# Patient Record
Sex: MALE | Race: WHITE | Employment: FULL TIME
[De-identification: names, ages, dates, MRNs, and addresses within clinical notes are randomized per-mention and may not be internally consistent; named-entity substitution may affect disease eponyms.]

---

## 2020-08-26 ENCOUNTER — EMPLOYEE WELLNESS (OUTPATIENT)
Dept: OTHER | Facility: CLINIC | Age: 45
End: 2020-08-26

## 2020-08-26 LAB
CHOLEST SERPL-MCNC: 158 MG/DL
GLUCOSE SERPL-MCNC: 92 MG/DL (ref 74–99)
HDLC SERPL-MCNC: 42 MG/DL (ref 40–60)
LDLC SERPL CALC-MCNC: 94.6 MG/DL (ref 0–100)
TRIGL SERPL-MCNC: 107 MG/DL (ref ?–150)

## 2021-07-06 LAB
CHOLEST SERPL-MCNC: 164 MG/DL
GLUCOSE SERPL-MCNC: 86 MG/DL (ref 74–99)
HDLC SERPL-MCNC: 39 MG/DL (ref 40–60)
LDLC SERPL CALC-MCNC: 104.8 MG/DL (ref 0–100)
TRIGL SERPL-MCNC: 101 MG/DL (ref ?–150)

## 2021-09-23 ENCOUNTER — TRANSCRIBE ORDER (OUTPATIENT)
Dept: SCHEDULING | Age: 46
End: 2021-09-23

## 2021-09-24 ENCOUNTER — TRANSCRIBE ORDER (OUTPATIENT)
Dept: SCHEDULING | Age: 46
End: 2021-09-24

## 2021-09-24 DIAGNOSIS — R10.10 UPPER ABDOMINAL PAIN: Primary | ICD-10-CM

## 2021-10-15 ENCOUNTER — HOSPITAL ENCOUNTER (OUTPATIENT)
Dept: CT IMAGING | Age: 46
Discharge: HOME | End: 2021-10-15
Payer: COMMERCIAL

## 2021-10-15 DIAGNOSIS — R10.10 UPPER ABDOMINAL PAIN: ICD-10-CM

## 2021-10-15 LAB — CREAT UR-MCNC: 1 MG/DL (ref 0.6–1.3)

## 2021-10-15 PROCEDURE — 74177 CT ABD & PELVIS W/CONTRAST: CPT

## 2021-10-15 PROCEDURE — 82565 ASSAY OF CREATININE: CPT

## 2021-10-15 PROCEDURE — 74011000636 HC RX REV CODE- 636

## 2021-10-15 RX ADMIN — IOPAMIDOL 100 ML: 612 INJECTION, SOLUTION INTRAVENOUS at 17:10

## 2021-11-05 ENCOUNTER — TRANSCRIBE ORDER (OUTPATIENT)
Dept: SCHEDULING | Age: 46
End: 2021-11-05

## 2021-11-05 DIAGNOSIS — K80.20 GALLSTONE: Primary | ICD-10-CM

## 2021-11-12 ENCOUNTER — HOSPITAL ENCOUNTER (OUTPATIENT)
Dept: NUCLEAR MEDICINE | Age: 46
Discharge: HOME | End: 2021-11-12
Payer: COMMERCIAL

## 2021-11-12 VITALS — WEIGHT: 270 LBS

## 2021-11-12 DIAGNOSIS — K80.20 GALLSTONE: ICD-10-CM

## 2021-11-12 PROCEDURE — A9537 TC99M MEBROFENIN: HCPCS

## 2021-11-12 PROCEDURE — 74011000258 HC RX REV CODE- 258

## 2021-11-12 PROCEDURE — 74011250636 HC RX REV CODE- 250/636

## 2021-11-12 RX ORDER — SODIUM CHLORIDE 9 MG/ML
50 INJECTION, SOLUTION INTRAVENOUS
Status: COMPLETED | OUTPATIENT
Start: 2021-11-12 | End: 2021-11-12

## 2021-11-12 RX ORDER — KIT FOR THE PREPARATION OF TECHNETIUM TC 99M MEBROFENIN 45 MG/10ML
6.1 INJECTION, POWDER, LYOPHILIZED, FOR SOLUTION INTRAVENOUS
Status: COMPLETED | OUTPATIENT
Start: 2021-11-12 | End: 2021-11-12

## 2021-11-12 RX ADMIN — KIT FOR THE PREPARATION OF TECHNETIUM TC 99M MEBROFENIN 6.1 MILLICURIE: 45 INJECTION, POWDER, LYOPHILIZED, FOR SOLUTION INTRAVENOUS at 09:30

## 2021-11-12 RX ADMIN — SINCALIDE 2.45 MCG: 5 INJECTION, POWDER, LYOPHILIZED, FOR SOLUTION INTRAVENOUS at 11:45

## 2021-11-12 RX ADMIN — SODIUM CHLORIDE 50 ML/HR: 900 INJECTION, SOLUTION INTRAVENOUS at 11:45

## 2021-12-17 ENCOUNTER — OFFICE VISIT (OUTPATIENT)
Dept: SURGERY | Age: 46
End: 2021-12-17
Payer: COMMERCIAL

## 2021-12-17 VITALS
BODY MASS INDEX: 34.57 KG/M2 | HEART RATE: 70 BPM | OXYGEN SATURATION: 98 % | HEIGHT: 75 IN | WEIGHT: 278 LBS | DIASTOLIC BLOOD PRESSURE: 88 MMHG | TEMPERATURE: 97.8 F | RESPIRATION RATE: 18 BRPM | SYSTOLIC BLOOD PRESSURE: 134 MMHG

## 2021-12-17 DIAGNOSIS — K80.10 CHRONIC CHOLECYSTITIS WITH CALCULUS: Primary | ICD-10-CM

## 2021-12-17 PROCEDURE — 99204 OFFICE O/P NEW MOD 45 MIN: CPT | Performed by: SURGERY

## 2021-12-17 RX ORDER — LISINOPRIL AND HYDROCHLOROTHIAZIDE 10; 12.5 MG/1; MG/1
TABLET ORAL
COMMUNITY
Start: 2021-12-10

## 2021-12-17 NOTE — PROGRESS NOTES
Consult    Patient: Judith Dominguez MRN: [id number]  SSN: [id number]    YOB: 1975  Age: 48 y.o. Sex: male      Subjective:      Judith Dominguez is a 48 y.o. male white male who presents with approximate 2-year history of intermittent abdominal pain was initially was diffuse in a bandlike distribution of around the upper abdomen but over the last years is gradually developed in the primarily the right flank discomfort which is constantly present however is exacerbated by fatty foods with associated nausea and bloating. Patient underwent a diagnostic evaluation and was found to have cholelithiasis for which he is  referred for surgical opinion regard to management. Patient denies acholic stools bilirubinuria jaundice and history of pancreatitis  Allergies: No known drug allergies  Current medications: See medication list  Past medical history:  1. (Reviewed by [employer] 35 with obesity comorbidities of hypertension and obstructive sleep apnea. Past surgical history:  1. History of rectal carcinoid 2019 status post excision  Social history: Denies utilization with tobacco or alcohol  Family history: Mother 74hypercholesterolemia  Father 75history of colon and prostate carcinoma, hypercholesterolemia, hypertension, clinical severe obesity  Sister 45healthy    No Known Allergies  Current Outpatient Medications   Medication Sig Dispense Refill    lisinopril-hydroCHLOROthiazide (PRINZIDE, ZESTORETIC) 10-12.5 mg per tablet       therapeutic multivitamin-minerals (THERAGRAN-M) tablet Take 1 Tablet by mouth daily.        Past Medical History:   Diagnosis Date    Hypertension     Sleep apnea     cpap     Past Surgical History:   Procedure Laterality Date    HX COLONOSCOPY      HX GI      neuroendocrine tumor from rectal area      Social History     Tobacco Use    Smoking status: Never Smoker    Smokeless tobacco: Never Used   Substance Use Topics    Alcohol use: Not Currently     No family history on file. Review of Systems:    General: Denies fevers, chills, night sweats, fatigue, weight loss, or weight gain. HEENT: Denies changes in auditory or visual acuity, recurrent pharyngitis, epistaxis, chronic rhinorrhea, vertigo    Respiratory: Denies increasing shortness of breath, productive cough, hemoptysis    Cardiac: Denies known history of cardiac disease, heart murmur, palpitations    GI: Denies dysphagia, recurrent emesis, hematemesis, changes in bowel habits, hematochezia, melena    : Denies hematuria frequency urgency dysuria    Musculoskeletal: Denies fractures, dislocations    Neurologic: Denies history of CVA, paralysis paresthesias, recurrent cephalgia, seizures    Endocrine: Denies polyuria, polydipsia, polyphagia, heat and cold intolerance    Lymph/heme: Denies a history of malignancy, anemia, bruising, blood transfusions    Integumentary: Negative for dermatitis     Objective:     Vitals:    12/17/21 1408   BP: 134/88   Pulse: 70   Resp: 18   Temp: 97.8 °F (36.6 °C)   SpO2: 98%   Weight: 126.1 kg (278 lb)   Height: 6' 3\" (1.905 m)        General: no acute distress, nontoxic in appearance. Head: Normocephalic, atraumatic  Mouth: Clear, no overt lesions, oral mucosa is pink and moist.  Neck: Supple, no masses, no adenopathy or carotid bruits, trachea midline  Resp: Clear to auscultation bilaterally, no wheezing, rhonchi, or rales, excursions normal and symmetrical.  Cardio: Regular rate and rhythm, no murmurs, clicks, gallops, or rubs. Abdomen: soft, nontender, nondistended, normoactive bowel sounds, no hernias. Extremities: Warm, well perfused, no tenderness or swelling, normal gait/station, without edema or varicosities  Neuro: Sensation and strength grossly intact and symmetrical.  Psych: Alert and oriented to person, place, and time.   September 17, 2021 CBC, CMP: SGPT 49  September 24, 2021 abdominal CT: Cholelithiasis  November 5, 2021 HIDA scan negative however administration of CCK duplicated symptoms          Assessment:     Symptomatic chronic calculus cholecystitis    Plan:     Discussed the clinical presentation laboratory and radiologic evaluation. Patient after discussing the fact that his symptomatology is somewhat atypical is abdominal discomfort and complaints overnight secondary to biliary disease. After discussing the options he is elected proceed with surgical invention utilizing laparoscopic tensely open cholecystectomy with intraoperative cholangiography.   Procedures patient very [address] time convenient for the patient seen in follow-up after 2 weeks following surgical procedure    Signed By: Lorenzo Gallardo DO     December 17, 2021

## 2021-12-17 NOTE — H&P (VIEW-ONLY)
Consult    Patient: Zoie Castrejon MRN: [id number]  SSN: [id number]    YOB: 1975  Age: 48 y.o. Sex: male      Subjective:      Zoie Castrejon is a 48 y.o. male white male who presents with approximate 2-year history of intermittent abdominal pain was initially was diffuse in a bandlike distribution of around the upper abdomen but over the last years is gradually developed in the primarily the right flank discomfort which is constantly present however is exacerbated by fatty foods with associated nausea and bloating. Patient underwent a diagnostic evaluation and was found to have cholelithiasis for which he is  referred for surgical opinion regard to management. Patient denies acholic stools bilirubinuria jaundice and history of pancreatitis  Allergies: No known drug allergies  Current medications: See medication list  Past medical history:  1. (Reviewed by [employer] [facility] with obesity comorbidities of hypertension and obstructive sleep apnea. Past surgical history:  1. History of rectal carcinoid 2019 status post excision  Social history: Denies utilization with tobacco or alcohol  Family history: Mother 74hypercholesterolemia  Father 75history of colon and prostate carcinoma, hypercholesterolemia, hypertension, clinical severe obesity  Sister 45healthy    No Known Allergies  Current Outpatient Medications   Medication Sig Dispense Refill    lisinopril-hydroCHLOROthiazide (PRINZIDE, ZESTORETIC) 10-12.5 mg per tablet       therapeutic multivitamin-minerals (THERAGRAN-M) tablet Take 1 Tablet by mouth daily.        Past Medical History:   Diagnosis Date    Hypertension     Sleep apnea     cpap     Past Surgical History:   Procedure Laterality Date    HX COLONOSCOPY      HX GI      neuroendocrine tumor from rectal area      Social History     Tobacco Use    Smoking status: Never Smoker    Smokeless tobacco: Never Used   Substance Use Topics    Alcohol use: Not Currently     No family history on file. Review of Systems:    General: Denies fevers, chills, night sweats, fatigue, weight loss, or weight gain. HEENT: Denies changes in auditory or visual acuity, recurrent pharyngitis, epistaxis, chronic rhinorrhea, vertigo    Respiratory: Denies increasing shortness of breath, productive cough, hemoptysis    Cardiac: Denies known history of cardiac disease, heart murmur, palpitations    GI: Denies dysphagia, recurrent emesis, hematemesis, changes in bowel habits, hematochezia, melena    : Denies hematuria frequency urgency dysuria    Musculoskeletal: Denies fractures, dislocations    Neurologic: Denies history of CVA, paralysis paresthesias, recurrent cephalgia, seizures    Endocrine: Denies polyuria, polydipsia, polyphagia, heat and cold intolerance    Lymph/heme: Denies a history of malignancy, anemia, bruising, blood transfusions    Integumentary: Negative for dermatitis     Objective:     Vitals:    12/17/21 1408   BP: 134/88   Pulse: 70   Resp: 18   Temp: 97.8 °F (36.6 °C)   SpO2: 98%   Weight: 126.1 kg (278 lb)   Height: 6' 3\" (1.905 m)        General: no acute distress, nontoxic in appearance. Head: Normocephalic, atraumatic  Mouth: Clear, no overt lesions, oral mucosa is pink and moist.  Neck: Supple, no masses, no adenopathy or carotid bruits, trachea midline  Resp: Clear to auscultation bilaterally, no wheezing, rhonchi, or rales, excursions normal and symmetrical.  Cardio: Regular rate and rhythm, no murmurs, clicks, gallops, or rubs. Abdomen: soft, nontender, nondistended, normoactive bowel sounds, no hernias. Extremities: Warm, well perfused, no tenderness or swelling, normal gait/station, without edema or varicosities  Neuro: Sensation and strength grossly intact and symmetrical.  Psych: Alert and oriented to person, place, and time.   September 17, 2021 CBC, CMP: SGPT 49  September 24, 2021 abdominal CT: Cholelithiasis  November 5, 2021 HIDA scan negative however administration of CCK duplicated symptoms          Assessment:     Symptomatic chronic calculus cholecystitis    Plan:     Discussed the clinical presentation laboratory and radiologic evaluation. Patient after discussing the fact that his symptomatology is somewhat atypical is abdominal discomfort and complaints overnight secondary to biliary disease. After discussing the options he is elected proceed with surgical invention utilizing laparoscopic tensely open cholecystectomy with intraoperative cholangiography.   Procedures patient very [address] time convenient for the patient seen in follow-up after 2 weeks following surgical procedure    Signed By: Jadon Fregoso DO     December 17, 2021

## 2021-12-23 NOTE — PERIOP NOTES
PRE-SURGICAL INSTRUCTIONS        Patient's Name:  Fan Cuba      Today's Date:  12/23/2021            Covid Testing Date and Time:    Surgery Date:  1/5/2022                1. Do NOT eat or drink anything, including candy, gum, or ice chips after midnight on 1/4/2021, unless you have specific instructions from your surgeon or anesthesia provider to do so.  2. You may brush your teeth before coming to the hospital.  3. No smoking 24 hours prior to the day of surgery. 4. No alcohol 24 hours prior to the day of surgery. 5. No recreational drugs for one week prior to the day of surgery. 6. Leave all valuables, including money/purse, at home. 7. Remove all jewelry, nail polish,  no lotions powders, deodorant, or perfume/cologne/after shave on the skin. 8. Follow instruction for Hibiclens washes and CHG wipes from surgeon's office. 9. Glasses/contact lenses and dentures may be worn to the hospital.  They will be removed prior to surgery. 10. Call your doctor if symptoms of a cold or illness develop within 24-48 hours prior to your surgery. 11.  If you are having an outpatient procedure, please make arrangements for a responsible ADULT TO [address] [place] [address] and stay with you for 24 hours after your surgery. 12. ONE VISITOR in the hospital at this time for outpatient procedures. Exceptions may be made for surgical admissions, per nursing unit guidelines      Special Instructions:    Bring COVID vaccination card  Bring any pertinent legal medical records. Take these medications the morning of surgery with a sip of water:  medications as directed by physician. Follow physician instructions about stopping anticoagulants. On the day of surgery, come in the main entrance of [facility] [employer]. Let the  at the desk know you are there for surgery. A staff member will come escort you to the surgical area on the second floor.     If you have any questions or concerns, please do not hesitate to call:     (Prior to the day of surgery) PAT department:  [phone]   (Day of surgery) Pre-Op department:  [phone]    These surgical instructions were reviewed with patient during the PAT phone call.

## 2022-01-04 ENCOUNTER — ANESTHESIA EVENT (OUTPATIENT)
Dept: SURGERY | Age: 47
End: 2022-01-04
Payer: COMMERCIAL

## 2022-01-05 ENCOUNTER — APPOINTMENT (OUTPATIENT)
Dept: GENERAL RADIOLOGY | Age: 47
End: 2022-01-05
Attending: SURGERY
Payer: COMMERCIAL

## 2022-01-05 ENCOUNTER — ANESTHESIA (OUTPATIENT)
Dept: SURGERY | Age: 47
End: 2022-01-05
Payer: COMMERCIAL

## 2022-01-05 ENCOUNTER — HOSPITAL ENCOUNTER (OUTPATIENT)
Age: 47
Discharge: HOME | End: 2022-01-05
Attending: SURGERY | Admitting: SURGERY
Payer: COMMERCIAL

## 2022-01-05 VITALS
HEIGHT: 75 IN | TEMPERATURE: 96.8 F | BODY MASS INDEX: 34.32 KG/M2 | RESPIRATION RATE: 17 BRPM | OXYGEN SATURATION: 95 % | SYSTOLIC BLOOD PRESSURE: 159 MMHG | WEIGHT: 276 LBS | DIASTOLIC BLOOD PRESSURE: 90 MMHG | HEART RATE: 56 BPM

## 2022-01-05 DIAGNOSIS — K80.10 CHRONIC CHOLECYSTITIS WITH CALCULUS: Primary | ICD-10-CM

## 2022-01-05 LAB
ANION GAP SERPL CALC-SCNC: 5 MMOL/L (ref 3–18)
BUN SERPL-MCNC: 12 MG/DL (ref 7–18)
BUN/CREAT SERPL: 14 (ref 12–20)
CALCIUM SERPL-MCNC: 9.1 MG/DL (ref 8.5–10.1)
CHLORIDE SERPL-SCNC: 105 MMOL/L (ref 100–111)
CO2 SERPL-SCNC: 27 MMOL/L (ref 21–32)
CREAT SERPL-MCNC: 0.87 MG/DL (ref 0.6–1.3)
GLUCOSE SERPL-MCNC: 98 MG/DL (ref 74–99)
POTASSIUM SERPL-SCNC: 3.7 MMOL/L (ref 3.5–5.5)
SODIUM SERPL-SCNC: 137 MMOL/L (ref 136–145)

## 2022-01-05 PROCEDURE — 77030018875 HC APPL CLP LIG4 J&J -B: Performed by: SURGERY

## 2022-01-05 PROCEDURE — 74011250636 HC RX REV CODE- 250/636: Performed by: NURSE ANESTHETIST, CERTIFIED REGISTERED

## 2022-01-05 PROCEDURE — 76060000033 HC ANESTHESIA 1 TO 1.5 HR: Performed by: SURGERY

## 2022-01-05 PROCEDURE — 77030008603 HC TRCR ENDOSC EPATH J&J -C: Performed by: SURGERY

## 2022-01-05 PROCEDURE — 77030040922 HC BLNKT HYPOTHRM STRY -A: Performed by: SURGERY

## 2022-01-05 PROCEDURE — C1758 CATHETER, URETERAL: HCPCS | Performed by: SURGERY

## 2022-01-05 PROCEDURE — 74011000250 HC RX REV CODE- 250: Performed by: SURGERY

## 2022-01-05 PROCEDURE — 77030040361 HC SLV COMPR DVT MDII -B: Performed by: SURGERY

## 2022-01-05 PROCEDURE — 76010000149 HC OR TIME 1 TO 1.5 HR: Performed by: SURGERY

## 2022-01-05 PROCEDURE — 00790 ANES IPER UPR ABD NOS: CPT | Performed by: ANESTHESIOLOGY

## 2022-01-05 PROCEDURE — 74011250636 HC RX REV CODE- 250/636: Performed by: SURGERY

## 2022-01-05 PROCEDURE — 47562 LAPAROSCOPIC CHOLECYSTECTOMY: CPT | Performed by: SURGERY

## 2022-01-05 PROCEDURE — 77030009851 HC PCH RTVR ENDOSC AMR -B: Performed by: SURGERY

## 2022-01-05 PROCEDURE — 77030002933 HC SUT MCRYL J&J -A: Performed by: SURGERY

## 2022-01-05 PROCEDURE — 77030018836 HC SOL IRR NACL ICUM -A: Performed by: SURGERY

## 2022-01-05 PROCEDURE — 74011000250 HC RX REV CODE- 250: Performed by: NURSE ANESTHETIST, CERTIFIED REGISTERED

## 2022-01-05 PROCEDURE — 00790 ANES IPER UPR ABD NOS: CPT | Performed by: NURSE ANESTHETIST, CERTIFIED REGISTERED

## 2022-01-05 PROCEDURE — 77030008756 HC TU IRR SUC STRY -B: Performed by: SURGERY

## 2022-01-05 PROCEDURE — 80048 BASIC METABOLIC PNL TOTAL CA: CPT

## 2022-01-05 PROCEDURE — 2709999900 HC NON-CHARGEABLE SUPPLY: Performed by: SURGERY

## 2022-01-05 PROCEDURE — 77030031139 HC SUT VCRL2 J&J -A: Performed by: SURGERY

## 2022-01-05 PROCEDURE — 76210000020 HC REC RM PH II FIRST 0.5 HR: Performed by: SURGERY

## 2022-01-05 PROCEDURE — 77030010507 HC ADH SKN DERMBND J&J -B: Performed by: SURGERY

## 2022-01-05 PROCEDURE — C9290 INJ, BUPIVACAINE LIPOSOME: HCPCS | Performed by: SURGERY

## 2022-01-05 PROCEDURE — 77030010031 HC SCIS ENDOSC MPLR J&J -C: Performed by: SURGERY

## 2022-01-05 PROCEDURE — 88304 TISSUE EXAM BY PATHOLOGIST: CPT

## 2022-01-05 PROCEDURE — 77030008602 HC TRCR ENDOSC EPATH J&J -B: Performed by: SURGERY

## 2022-01-05 PROCEDURE — 74011250637 HC RX REV CODE- 250/637: Performed by: SURGERY

## 2022-01-05 PROCEDURE — 76210000016 HC OR PH I REC 1 TO 1.5 HR: Performed by: SURGERY

## 2022-01-05 RX ORDER — DEXAMETHASONE SODIUM PHOSPHATE 4 MG/ML
INJECTION, SOLUTION INTRA-ARTICULAR; INTRALESIONAL; INTRAMUSCULAR; INTRAVENOUS; SOFT TISSUE AS NEEDED
Status: DISCONTINUED | OUTPATIENT
Start: 2022-01-05 | End: 2022-01-05 | Stop reason: HOSPADM

## 2022-01-05 RX ORDER — SUCCINYLCHOLINE CHLORIDE 20 MG/ML
INJECTION INTRAMUSCULAR; INTRAVENOUS AS NEEDED
Status: DISCONTINUED | OUTPATIENT
Start: 2022-01-05 | End: 2022-01-05 | Stop reason: HOSPADM

## 2022-01-05 RX ORDER — HYDROCODONE BITARTRATE AND ACETAMINOPHEN 5; 325 MG/1; MG/1
1 TABLET ORAL AS NEEDED
Status: DISCONTINUED | OUTPATIENT
Start: 2022-01-05 | End: 2022-01-05 | Stop reason: HOSPADM

## 2022-01-05 RX ORDER — LIDOCAINE HYDROCHLORIDE 20 MG/ML
INJECTION, SOLUTION EPIDURAL; INFILTRATION; INTRACAUDAL; PERINEURAL AS NEEDED
Status: DISCONTINUED | OUTPATIENT
Start: 2022-01-05 | End: 2022-01-05 | Stop reason: HOSPADM

## 2022-01-05 RX ORDER — ROCURONIUM BROMIDE 10 MG/ML
INJECTION, SOLUTION INTRAVENOUS AS NEEDED
Status: DISCONTINUED | OUTPATIENT
Start: 2022-01-05 | End: 2022-01-05 | Stop reason: HOSPADM

## 2022-01-05 RX ORDER — NEOSTIGMINE METHYLSULFATE 1 MG/ML
INJECTION, SOLUTION INTRAVENOUS AS NEEDED
Status: DISCONTINUED | OUTPATIENT
Start: 2022-01-05 | End: 2022-01-05 | Stop reason: HOSPADM

## 2022-01-05 RX ORDER — LIDOCAINE HYDROCHLORIDE 10 MG/ML
0.1 INJECTION, SOLUTION EPIDURAL; INFILTRATION; INTRACAUDAL; PERINEURAL AS NEEDED
Status: DISCONTINUED | OUTPATIENT
Start: 2022-01-05 | End: 2022-01-05 | Stop reason: HOSPADM

## 2022-01-05 RX ORDER — IBUPROFEN 800 MG/1
800 TABLET ORAL
Qty: 30 TABLET | Refills: 1 | Status: SHIPPED | OUTPATIENT
Start: 2022-01-05 | End: 2022-01-28 | Stop reason: ALTCHOICE

## 2022-01-05 RX ORDER — ENOXAPARIN SODIUM 100 MG/ML
40 INJECTION SUBCUTANEOUS ONCE
Status: COMPLETED | OUTPATIENT
Start: 2022-01-05 | End: 2022-01-05

## 2022-01-05 RX ORDER — KETOROLAC TROMETHAMINE 30 MG/ML
INJECTION, SOLUTION INTRAMUSCULAR; INTRAVENOUS
Status: DISCONTINUED
Start: 2022-01-05 | End: 2022-01-05 | Stop reason: HOSPADM

## 2022-01-05 RX ORDER — OXYCODONE AND ACETAMINOPHEN 5; 325 MG/1; MG/1
1 TABLET ORAL
Status: DISCONTINUED | OUTPATIENT
Start: 2022-01-05 | End: 2022-01-05 | Stop reason: HOSPADM

## 2022-01-05 RX ORDER — SODIUM CHLORIDE 9 MG/ML
INJECTION INTRAMUSCULAR; INTRAVENOUS; SUBCUTANEOUS AS NEEDED
Status: DISCONTINUED | OUTPATIENT
Start: 2022-01-05 | End: 2022-01-05 | Stop reason: HOSPADM

## 2022-01-05 RX ORDER — SODIUM CHLORIDE, SODIUM LACTATE, POTASSIUM CHLORIDE, CALCIUM CHLORIDE 600; 310; 30; 20 MG/100ML; MG/100ML; MG/100ML; MG/100ML
50 INJECTION, SOLUTION INTRAVENOUS CONTINUOUS
Status: DISCONTINUED | OUTPATIENT
Start: 2022-01-05 | End: 2022-01-05 | Stop reason: HOSPADM

## 2022-01-05 RX ORDER — OXYCODONE AND ACETAMINOPHEN 5; 325 MG/1; MG/1
1 TABLET ORAL
Qty: 15 TABLET | Refills: 0 | Status: SHIPPED | OUTPATIENT
Start: 2022-01-05 | End: 2022-01-08

## 2022-01-05 RX ORDER — PROPOFOL 10 MG/ML
INJECTION, EMULSION INTRAVENOUS AS NEEDED
Status: DISCONTINUED | OUTPATIENT
Start: 2022-01-05 | End: 2022-01-05 | Stop reason: HOSPADM

## 2022-01-05 RX ORDER — KETOROLAC TROMETHAMINE 15 MG/ML
15 INJECTION, SOLUTION INTRAMUSCULAR; INTRAVENOUS ONCE
Status: COMPLETED | OUTPATIENT
Start: 2022-01-05 | End: 2022-01-05

## 2022-01-05 RX ORDER — HYDROMORPHONE HYDROCHLORIDE 2 MG/ML
0.5 INJECTION, SOLUTION INTRAMUSCULAR; INTRAVENOUS; SUBCUTANEOUS
Status: DISCONTINUED | OUTPATIENT
Start: 2022-01-05 | End: 2022-01-05 | Stop reason: HOSPADM

## 2022-01-05 RX ORDER — SODIUM CHLORIDE, SODIUM LACTATE, POTASSIUM CHLORIDE, CALCIUM CHLORIDE 600; 310; 30; 20 MG/100ML; MG/100ML; MG/100ML; MG/100ML
25 INJECTION, SOLUTION INTRAVENOUS CONTINUOUS
Status: DISCONTINUED | OUTPATIENT
Start: 2022-01-05 | End: 2022-01-05 | Stop reason: HOSPADM

## 2022-01-05 RX ORDER — ONDANSETRON 2 MG/ML
4 INJECTION INTRAMUSCULAR; INTRAVENOUS
Status: COMPLETED | OUTPATIENT
Start: 2022-01-05 | End: 2022-01-05

## 2022-01-05 RX ORDER — FENTANYL CITRATE 50 UG/ML
INJECTION, SOLUTION INTRAMUSCULAR; INTRAVENOUS AS NEEDED
Status: DISCONTINUED | OUTPATIENT
Start: 2022-01-05 | End: 2022-01-05 | Stop reason: HOSPADM

## 2022-01-05 RX ORDER — ONDANSETRON 2 MG/ML
INJECTION INTRAMUSCULAR; INTRAVENOUS AS NEEDED
Status: DISCONTINUED | OUTPATIENT
Start: 2022-01-05 | End: 2022-01-05 | Stop reason: HOSPADM

## 2022-01-05 RX ORDER — INSULIN LISPRO 100 [IU]/ML
INJECTION, SOLUTION INTRAVENOUS; SUBCUTANEOUS ONCE
Status: DISCONTINUED | OUTPATIENT
Start: 2022-01-05 | End: 2022-01-05 | Stop reason: HOSPADM

## 2022-01-05 RX ORDER — GLYCOPYRROLATE 0.2 MG/ML
INJECTION INTRAMUSCULAR; INTRAVENOUS AS NEEDED
Status: DISCONTINUED | OUTPATIENT
Start: 2022-01-05 | End: 2022-01-05 | Stop reason: HOSPADM

## 2022-01-05 RX ORDER — FENTANYL CITRATE 50 UG/ML
25 INJECTION, SOLUTION INTRAMUSCULAR; INTRAVENOUS AS NEEDED
Status: DISCONTINUED | OUTPATIENT
Start: 2022-01-05 | End: 2022-01-05 | Stop reason: HOSPADM

## 2022-01-05 RX ADMIN — ROCURONIUM BROMIDE 5 MG: 50 INJECTION INTRAVENOUS at 08:28

## 2022-01-05 RX ADMIN — PROPOFOL 180 MG: 10 INJECTION, EMULSION INTRAVENOUS at 07:56

## 2022-01-05 RX ADMIN — KETOROLAC TROMETHAMINE 15 MG: 15 INJECTION, SOLUTION INTRAMUSCULAR; INTRAVENOUS at 10:00

## 2022-01-05 RX ADMIN — ROCURONIUM BROMIDE 10 MG: 50 INJECTION INTRAVENOUS at 07:56

## 2022-01-05 RX ADMIN — Medication 5 MG: at 08:48

## 2022-01-05 RX ADMIN — GLYCOPYRROLATE 1 MG: 0.2 INJECTION INTRAMUSCULAR; INTRAVENOUS at 08:48

## 2022-01-05 RX ADMIN — OXYCODONE HYDROCHLORIDE AND ACETAMINOPHEN 1 TABLET: 5; 325 TABLET ORAL at 09:59

## 2022-01-05 RX ADMIN — DEXAMETHASONE SODIUM PHOSPHATE 4 MG: 4 INJECTION, SOLUTION INTRAMUSCULAR; INTRAVENOUS at 07:56

## 2022-01-05 RX ADMIN — LIDOCAINE HYDROCHLORIDE 50 MG: 20 INJECTION, SOLUTION EPIDURAL; INFILTRATION; INTRACAUDAL; PERINEURAL at 07:56

## 2022-01-05 RX ADMIN — SODIUM CHLORIDE, SODIUM LACTATE, POTASSIUM CHLORIDE, AND CALCIUM CHLORIDE 25 ML/HR: 600; 310; 30; 20 INJECTION, SOLUTION INTRAVENOUS at 06:30

## 2022-01-05 RX ADMIN — SUCCINYLCHOLINE CHLORIDE 100 MG: 20 INJECTION, SOLUTION INTRAMUSCULAR; INTRAVENOUS at 07:56

## 2022-01-05 RX ADMIN — ONDANSETRON 4 MG: 2 INJECTION INTRAMUSCULAR; INTRAVENOUS at 09:18

## 2022-01-05 RX ADMIN — WATER 3 G: 1 INJECTION INTRAMUSCULAR; INTRAVENOUS; SUBCUTANEOUS at 08:05

## 2022-01-05 RX ADMIN — FENTANYL CITRATE 50 MCG: 50 INJECTION, SOLUTION INTRAMUSCULAR; INTRAVENOUS at 08:53

## 2022-01-05 RX ADMIN — HYDROMORPHONE HYDROCHLORIDE 0.5 MG: 2 INJECTION, SOLUTION INTRAMUSCULAR; INTRAVENOUS; SUBCUTANEOUS at 09:18

## 2022-01-05 RX ADMIN — FENTANYL CITRATE 50 MCG: 50 INJECTION, SOLUTION INTRAMUSCULAR; INTRAVENOUS at 09:05

## 2022-01-05 RX ADMIN — ENOXAPARIN SODIUM 40 MG: 100 INJECTION SUBCUTANEOUS at 06:45

## 2022-01-05 RX ADMIN — ROCURONIUM BROMIDE 20 MG: 50 INJECTION INTRAVENOUS at 08:05

## 2022-01-05 RX ADMIN — FENTANYL CITRATE 50 MCG: 50 INJECTION, SOLUTION INTRAMUSCULAR; INTRAVENOUS at 08:21

## 2022-01-05 RX ADMIN — FENTANYL CITRATE 100 MCG: 50 INJECTION, SOLUTION INTRAMUSCULAR; INTRAVENOUS at 07:56

## 2022-01-05 RX ADMIN — ONDANSETRON 4 MG: 2 INJECTION INTRAMUSCULAR; INTRAVENOUS at 08:40

## 2022-01-05 RX ADMIN — FENTANYL CITRATE 50 MCG: 50 INJECTION, SOLUTION INTRAMUSCULAR; INTRAVENOUS at 08:28

## 2022-01-05 RX ADMIN — FAMOTIDINE 20 MG: 10 INJECTION INTRAVENOUS at 06:45

## 2022-01-05 NOTE — PROGRESS NOTES
conducted a pre-surgery visit with Urmila Bynum, who is a 48 y.o.,male. The  provided the following Interventions:  Initiated a relationship of care and support. Offered prayer and assurance of continued prayers on patient's behalf. There is no advance directive for this patient. Plan:  Chaplains will continue to follow and will provide pastoral care on an as needed/requested basis.  recommends bedside caregivers page  on duty if patient shows signs of acute spiritual or emotional distress.   Reiseñor 3   Board Certified [address] [place] [address]   [phone]

## 2022-01-05 NOTE — INTERVAL H&P NOTE
Update History & Physical    The Patient's History and Physical above was reviewed. There is been no interval medical surgical history. The proposed laparoscopic potentially open cholecystectomy with intraoperative cholangiography was reviewed with the patient and I examined the patient. There was no change in surgical plan. The surgical site was confirmed by the patient and me. Plan:  The risk, benefits, expected outcome, and alternative to the recommended procedure have been discussed with the patient. Patient understands and wants to proceed with the procedure.     Electronically signed by Paula Mcnamara DO on 1/5/2022 at 7:43 AM

## 2022-01-05 NOTE — DISCHARGE INSTRUCTIONS
Regular diet   Resume preadmission medications   Motrin/Percocet as needed analgesia   Activity as tolerated   Avoiding lifting greater than 20 pounds or sit ups for 30 days following surgical procedure   Remove dressings and begin daily showers 1/6/2022   Trim Steri-Strips as needed    Patient Education        Gallbladder Removal Surgery: What to Expect at [address] [place] [address]  After your surgery, you will likely feel weak and tired for several days after you return home. Your belly may be swollen. If you had laparoscopic surgery, you may also have pain in your shoulder for about 24 hours. You may have gas or need to burp a lot at first. A few people get diarrhea. The diarrhea usually goes away in 2 to 4 weeks, but it may last longer. How quickly you recover depends on whether you had a laparoscopic or open surgery. · For a laparoscopic surgery, most people can go back to work or their normal routine in 1 to 2 weeks. But it may take longer, depending on the type of work you do. · For an open surgery, it will probably take 4 to 6 weeks before you get back to your normal routine. This care sheet gives you a general idea about how long it will take for you to recover. However, each person recovers at a different pace. Follow the steps below to get better as quickly as possible. How can you care for yourself at home? Activity    · Rest when you feel tired. Getting enough sleep will help you recover.     · Try to walk each day. Start out by walking a little more than you did the day before. Gradually increase the amount you walk. Walking boosts blood flow and helps prevent pneumonia and constipation.     · For about 2 to 4 weeks, avoid lifting anything that would make you strain.  This may include a child, heavy grocery bags and milk containers, a heavy briefcase or backpack, cat litter or dog food bags, or a vacuum .     · Avoid strenuous activities, such as biking, jogging, weightlifting, and aerobic exercise, until your doctor says it is okay.     · You may shower 24 to 48 hours after surgery, if your doctor okays it. Pat the cut (incision) dry. Do not take a bath for the first 2 weeks, or until your doctor tells you it is okay.     · You may drive when you are no longer taking pain medicine and can quickly move your foot from the gas pedal to the brake. You must also be able to sit comfortably for a long period of time, even if you do not plan to go far. You might get caught in traffic.     · For a laparoscopic surgery, most people can go back to work or their normal routine in 1 to 2 weeks, but it may take longer. For an open surgery, it will probably take 4 to 6 weeks before you get back to your normal routine.     · Your doctor will tell you when you can have sex again. Diet    · When you feel like eating, start with small amounts of food. Avoid eating fatty foods for about 1 month. Fatty foods include hamburger, whole milk, cheese, and many snack foods.     · Drink plenty of fluids (unless your doctor tells you not to).   · If you have diarrhea, watch to see if specific foods cause it, and stop eating them. If the diarrhea continues for more than 2 weeks, talk to your doctor.     · You may notice that your bowel movements are not regular right after your surgery. This is common. Try to avoid constipation and straining with bowel movements. You may want to take a fiber supplement every day. If you have not had a bowel movement after a couple of days, ask your doctor about taking a mild laxative. Medicines    · Your doctor will tell you if and when you can restart your medicines. He or she will also give you instructions about taking any new medicines.     · If you take aspirin or some other blood thinner, ask your doctor if and when to start taking it again. Make sure that you understand exactly what your doctor wants you to do.     · Take pain medicines exactly as directed.   ? If the doctor gave you a prescription medicine for pain, take it as prescribed. ? If you are not taking a prescription pain medicine, take an over-the-counter medicine such as acetaminophen (Tylenol), ibuprofen (Advil, Motrin), or naproxen (Aleve). Read and follow all instructions on the label. ? Do not take two or more pain medicines at the same time unless the doctor told you to. Many pain medicines contain acetaminophen, which is Tylenol. Too much Tylenol can be harmful.     · If you think your pain medicine is making you sick to your stomach:  ? Take your medicine after meals (unless your doctor tells you not to). ? Ask your doctor for a different pain medicine.     · If your doctor prescribed antibiotics, take them as directed. Do not stop taking them just because you feel better. You need to take the full course of antibiotics. Incision care    · If you have strips of tape on the incision, or cut, leave the tape on for a week or until it falls off.     · After 24 to 48 hours, wash the area daily with warm, soapy water, and pat it dry.     · You may have staples to hold the cut together. Keep them dry until your doctor takes them out. This is usually in 7 to 10 days.     · Keep the area clean and dry. You may cover it with a gauze bandage if it weeps or rubs against clothing. Change the bandage every day. Ice    · To reduce swelling and pain, put ice or a cold pack on your belly for 10 to 20 minutes at a time. Do this every 1 to 2 hours. Put a thin cloth between the ice and your skin. Follow-up care is a key part of your treatment and safety. Be sure to make and go to all appointments, and call your doctor if you are having problems. It's also a good idea to know your test results and keep a list of the medicines you take. When should you call for help? Call 911 anytime you think you may need emergency care. For example, call if:    · You passed out (lost consciousness).     · You are short of breath. .   Call your doctor now or seek immediate medical care if:    · You are sick to your stomach and cannot drink fluids.     · You have pain that does not get better when you take your pain medicine.     · You cannot pass stools or gas.     · You have signs of infection, such as:  ? Increased pain, swelling, warmth, or redness. ? Red streaks leading from the incision. ? Pus draining from the incision. ? A fever.     · Bright red blood has soaked through the bandage over your incision.     · You have loose stitches, or your incision comes open.     · You have signs of a blood clot in your leg (called a deep vein thrombosis), such as:  ? Pain in your calf, back of knee, thigh, or groin. ? Redness and swelling in your leg or groin. Watch closely for any changes in your health, and be sure to contact your doctor if you have any problems. Where can you learn more? Go to http://www.gray.com/  Enter F357 in the search box to learn more about \"Gallbladder Removal Surgery: What to Expect at Home. \"  Current as of: February 10, 2021               Content Version: 13.0  © 2144-4286 [employer]. Care instructions adapted under license by [employer] (which disclaims liability or warranty for this information). If you have questions about a medical condition or this instruction, always ask your healthcare professional. [address] any warranty or liability for your use of this information. DISCHARGE SUMMARY from Nurse    PATIENT INSTRUCTIONS:    After general anesthesia or intravenous sedation, for 24 hours or while taking prescription Narcotics:  · Limit your activities  · Do not drive and operate hazardous machinery  · Do not make important personal or business decisions  · Do  not drink alcoholic beverages  · If you have not urinated within 8 hours after discharge, please contact your surgeon on call.     Report the following to your surgeon:  · Excessive pain, swelling, redness or odor of or around the surgical area  · Temperature over 100.5  · Nausea and vomiting lasting longer than 4 hours or if unable to take medications  · Any signs of decreased circulation or nerve impairment to extremity: change in color, persistent  numbness, tingling, coldness or increase pain  · Any questions    What to do at Home:      *  Please give a list of your current medications to your Primary Care Provider. *  Please update this list whenever your medications are discontinued, doses are      changed, or new medications (including over-the-counter products) are added. *  Please carry medication information at all times in case of emergency situations. These are general instructions for a healthy lifestyle:    No smoking/ No tobacco products/ Avoid exposure to second hand smoke  Surgeon General's Warning:  Quitting smoking now greatly reduces serious risk to your health. Obesity, smoking, and sedentary lifestyle greatly increases your risk for illness    A healthy diet, regular physical exercise & weight monitoring are important for maintaining a healthy lifestyle    You may be retaining fluid if you have a history of heart failure or if you experience any of the following symptoms:  Weight gain of 3 pounds or more overnight or 5 pounds in a week, increased swelling in our hands or feet or shortness of breath while lying flat in bed. Please call your doctor as soon as you notice any of these symptoms; do not wait until your next office visit. The discharge information has been reviewed with the patient. The patient verbalized understanding. Discharge medications reviewed with the patient and appropriate educational materials and side effects teaching were provided.   ___________________________________________________________________________________________________________________________________

## 2022-01-05 NOTE — ANESTHESIA PREPROCEDURE EVALUATION
Relevant Problems   No relevant active problems       Anesthetic History   No history of anesthetic complications            Review of Systems / Medical History  Patient summary reviewed and pertinent labs reviewed    Pulmonary        Sleep apnea: CPAP           Neuro/Psych   Within defined limits           Cardiovascular    Hypertension: well controlled              Exercise tolerance: >4 METS     GI/Hepatic/Renal  Within defined limits              Endo/Other        Obesity     Other Findings              Physical Exam    Airway  Mallampati: II  TM Distance: 4 - 6 cm  Neck ROM: normal range of motion   Mouth opening: Normal     Cardiovascular  Regular rate and rhythm,  S1 and S2 normal,  no murmur, click, rub, or gallop             Dental  No notable dental hx       Pulmonary  Breath sounds clear to auscultation               Abdominal         Other Findings            Anesthetic Plan    ASA: 2  Anesthesia type: general          Induction: Intravenous  Anesthetic plan and risks discussed with: Patient

## 2022-01-05 NOTE — BRIEF OP NOTE
Brief Postoperative Note    Patient: Fan Cuba  YOB: 1975  MRN: [id number]    Date of Procedure: 1/5/2022     Pre-Op Diagnosis: Chronic cholecystitis with calculus [K80.10]    Post-Op Diagnosis: Same as preoperative diagnosis. Procedure(s):  LAPAROSCOPIC CHOLECYSTECTOMY WITH ATTEMPTED CHOLANGIOGRAM/C-ARM    Surgeon(s):  Evens Thompson DO    Surgical Assistant: Surg Asst-1: Mary Jo Ramos    Anesthesia: General     Estimated Blood Loss (mL): Minimal    Complications: None    Specimens:   ID Type Source Tests Collected by Time Destination   1 : gallbladder with contents Preservative Gallbladder  Evens Thompson DO 1/5/2022 5767 Pathology        Implants: * No implants in log *    Drains: * No LDAs found *    Findings: Omental adhesions to the inferior surface of the gallbladder, multiple stones contained within gallbladder lumen.   Very small cystic duct with small intraluminal diameter precluding successful cannulation for cholangiography    Electronically Signed by Jae Lopez DO on 1/5/2022 at 9:10 AM

## 2022-01-05 NOTE — ANESTHESIA POSTPROCEDURE EVALUATION
Procedure(s):  LAPAROSCOPIC CHOLECYSTECTOMY WITH POSSIBLE CHOLANGIOGRAM/C-ARM. general    Anesthesia Post Evaluation      Multimodal analgesia: multimodal analgesia used between 6 hours prior to anesthesia start to PACU discharge  Patient location during evaluation: bedside  Patient participation: complete - patient participated  Level of consciousness: awake  Pain score: 4  Pain management: adequate  Airway patency: patent  Anesthetic complications: no  Cardiovascular status: stable  Respiratory status: acceptable  Hydration status: acceptable  Post anesthesia nausea and vomiting:  controlled  Final Post Anesthesia Temperature Assessment:  Normothermia (36.0-37.5 degrees C)      INITIAL Post-op Vital signs:   Vitals Value Taken Time   /91 01/05/22 0946   Temp 36.3 °C (97.3 °F) 01/05/22 0912   Pulse 68 01/05/22 1004   Resp 27 01/05/22 0952   SpO2 96 % 01/05/22 0948   Vitals shown include unvalidated device data.

## 2022-01-06 ENCOUNTER — PATIENT OUTREACH (OUTPATIENT)
Dept: OTHER | Age: 47
End: 2022-01-06

## 2022-01-06 NOTE — PROGRESS NOTES
HPRP progress note    Patient eligible for Devin Argueta 994 care management    Received notification of discharge from [facility] OP on 1/5/22 for LAPAROSCOPIC CHOLECYSTECTOMY. Contacted patient to discuss post discharge needs and offer care management services. Two patient identifiers verified. Discussed the care management program.  Patient agrees to care management services at this time. PMH:   Past Medical History:   Diagnosis Date    Diverticulosis     Hypertension     Sleep apnea     cpap       Social History:   Social History     Socioeconomic History    Marital status:      Spouse name: Not on file    Number of children: Not on file    Years of education: Not on file    Highest education level: Not on file   Occupational History    Not on file   Tobacco Use    Smoking status: Never Smoker    Smokeless tobacco: Never Used   Vaping Use    Vaping Use: Never used   Substance and Sexual Activity    Alcohol use: Not Currently    Drug use: Never    Sexual activity: Not on file   Other Topics Concern    Not on file   Social History Narrative    Not on file     Social Determinants of Health     Financial Resource Strain:     Difficulty of Paying Living Expenses: Not on file   Food Insecurity:     Worried About [address] [employer] in the Last Year: Not on file    [address] in the Last Year: Not on file   Transportation Needs:     Lack of Transportation (Medical): Not on file    Lack of Transportation (Non-Medical):  Not on file   Physical Activity:     Days of Exercise per Week: Not on file    Minutes of Exercise per Session: Not on file   Stress:     Feeling of Stress : Not on file   Social Connections:     Frequency of Communication with Friends and Family: Not on file    Frequency of Social Gatherings with Friends and Family: Not on file    Attends Anabaptist Services: Not on file    Active Member of Clubs or Organizations: Not on file    Attends Club or Organization Meetings: Not on file    Marital Status: Not on file   Intimate Partner Violence:     Fear of Current or Ex-Partner: Not on file    Emotionally Abused: Not on file    Physically Abused: Not on file    Sexually Abused: Not on file   Housing Stability:     Unable to Pay for Housing in the Last Year: Not on file    Number of Pinky in the Last Year: Not on file    Unstable Housing in the Last Year: Not on file         Care management assessment completed:    *Spoke with patient who reports that he is doing well. Denies pain but does state that he his sore - taking Advil with relief. Also endorses a irritated Throat. Patient states he is up moving about without difficulty. Condition Focused Assessment:   PREOPERATIVE DIAGNOSIS:  Chronic calculous cholecystitis. POSTOPERATIVE DIAGNOSIS:  Chronic calculous cholecystitis    Surgical/Wound Condition Focused Assessment    Skin- any open wounds or incisions? yes  Description and location of wound- Port Incisions  In the last 24 hour have you experienced; Fever no    Low body temperature no    Chills or shaking no    Sweating no    Fast heart rate no    Fast breathing no    Dizziness/lightheadedness no    Confusion or unusual change in mental status no    Diarrhea no    Nausea no    Vomiting no    Shortness of breath or difficulty breathing no    Less urine output no    Cold, clammy, and pale skin no     Skin rash or skin color changes no  New or worsening pain? no  If yes, pain rated 0-10: 0 Location/pain characteristics: Soreness   New or worsening numbness or tingling? no  If yes, location of numbness and tingling: N/A    Patient reported important numbers to know:  Temperature 97.3 °F     Heart rate 68   Last /91   Weight 276 lbs     Activity level- moving several times a day, or as recommended? yes  Abnormal activity level reported: N/A   Bathing and showering instructions? yes  Nutrition- prescribed diet? no   Tolerating food? yes  Hydration- (how much in ounces per day) Drinking plenty of Fluids  Medications- new antibiotic? no             Pain medication? yes  Does patient understand how and when to take their medications? yes  If no, instructed patient on proper medication use? yes  Does patient have incentive spirometer? no  If yes, how frequently is patient using incentive spirometer? N/A      Red Flags:  Call 911 anytime you think you may need emergency care. For example, call if:   You passed out (lost consciousness). You are short of breath. Call your doctor now or seek immediate medical care if:  You are sick to your stomach and cannot drink fluids. You have pain that does not get better when you take your pain medicine. You cannot pass stools or gas. You have signs of infection, such as: Increased pain, swelling, warmth, or redness. Red streaks leading from the incision. Pus draining from the incision. A fever. Bright red blood has soaked through the bandage over your incision. You have loose stitches, or your incision comes open. You have signs of a blood clot in your leg (called a deep vein thrombosis), such as:  Pain in your calf, back of knee, thigh, or groin. Redness and swelling in your leg or groin. Medications:  New Medications at Discharge:   ibuprofen 800 mg tablet  oxyCODONE-acetaminophen 5-325 mg per tablet    Changed Medications at Discharge: None Noted  Discontinued Medications at Discharge: None Noted  Current Outpatient Medications   Medication Sig    oxyCODONE-acetaminophen (PERCOCET) 5-325 mg per tablet Take 1 Tablet by mouth every four (4) hours as needed for Pain for up to 3 days. Max Daily Amount: 6 Tablets. (Patient not taking: Reported on 1/6/2022)    ibuprofen (MOTRIN) 800 mg tablet Take 1 Tablet by mouth every eight (8) hours as needed for Pain.     lisinopril-hydroCHLOROthiazide (PRINZIDE, ZESTORETIC) 10-12.5 mg per tablet     therapeutic multivitamin-minerals (THERAGRAN-M) tablet Take 1 Tablet by mouth daily. No current facility-administered medications for this visit. There are no discontinued medications. Performed medication reconciliation with patient, and patient verbalizes understanding of administration of home medications. There were no barriers to obtaining medications identified at this time. Preventative Care     Health Maintenance   Topic Date Due    Hepatitis C Screening  Never done    Colorectal Cancer Screening Combo  Never done    COVID-19 Vaccine (2 - Pfizer 3-dose series) 01/27/2021    Flu Vaccine (1) Never done    DTaP/Tdap/Td series (2 - Td or Tdap) 04/04/2022    Lipid Screen  11/12/2025    Pneumococcal 0-64 years  Aged Out       CM Identified  Problems  (Contributing problems for risk for readmission)   1. Pain  2. Risk of Infection  3. Potential Learning Needs      Goals:  Initial Plan of Care as discussed and agreed with patient:      Patients pain will be controlled at 5/10 or less with pain medication and/or non-pharmacologic methods.   § 1/6/22 -  Rates pain as 0 on 0-10 pain scale     Impaired Skin Integrity   Demonstrates no Signs or Symptoms of Infection or other Red Flag Symptoms  Educated on signs and symptoms of infection to monitor for  1/6/22 -  Patient states Port Incisions are clean, Dry and Intact - no s/s of Infection noted   Completes all Follow-Up appointments within 30 days of Discharge    Educated on importance of Follow Up for prevention of complications  § [birth year] - States initial Post OP Appointment is Friday, 1/21/22@ 9:45a  W/  -Steven Postal, DO -  General Surgery     Barriers/Support system:  patient and spouse      Barriers/Challenges to Care: []  Decline in memory    []  Language barrier     []  Emotional                  []  Limited mobility  []  Lack of motivation     [] Vision, hearing or cognitive impairment []  Knowledge [] Financial Barriers []  Lack of support  []  Pain []  Other [x]  None    PCP/Specialist follow up: Patient is not scheduled to follow up with Zarina Baker MD on at this time  Post OP Follow Appointment see below:. Future Appointments   Date Time Provider Clementina Brucei   1/21/2022  9:45 AM DO GARFIELD Amador BS AMB      Reviewed red flags with patient, and patient verbalizes understanding. Patient given an opportunity to ask questions. No other clinical/social/functional needs noted. The patient agrees to contact the PCP office for questions related to their healthcare. The patient expressed thanks, offered no additional questions and ended the call.       Plan for next call: 1/21/22 f/u - Post OP Appointment

## 2022-01-06 NOTE — OP NOTES
[address] [place] [address]   OPERATIVE REPORT    Name:  Eusebio Segovia  MR#:   [id number]  :  1975  ACCOUNT #:  [de-identified]  DATE OF SERVICE:  2022    PREOPERATIVE DIAGNOSIS:  Chronic calculous cholecystitis. POSTOPERATIVE DIAGNOSIS:  Chronic calculous cholecystitis. PROCEDURE PERFORMED:  Laparoscopic cholecystectomy with attempted intraoperative cholangiography. SURGEON:  Moustapha Hernandez DO.    FIRST ASSISTANT:  Mary Jo Ramos SA.    ANESTHESIA:  General endotracheal supplemented at the conclusion of the operative procedure with local infiltration of the operative sites utilizing 266 mg of Exparel diluted in 50 mL of normal saline solution. SPECIMENS REMOVED:  Gallbladder and contents. COMPLICATIONS: None    IMPLANTS: None. ESTIMATED BLOOD LOSS:  Less than 10 mL. OPERATIVE FINDINGS:  The patient had omental adhesions to the inferior surface of the gallbladder. The gallbladder lumen contained multiple small-to-medium size stones. The cystic duct was small in caliber with a very small lumen precluding successful cannulation for intraoperative cholangiography. INDICATION FOR THE SURGICAL PROCEDURE:  This is a 76-year-old white male with a clinical history, physical examination after profound ultrasound demonstrated symptomatic cholelithiasis. The options for management were discussed and the patient elected to proceed with laparoscopic, potentially open Jose-en-Y gastric bypass to achieve definitive durable weight loss on a personal level with expected resolution of obesity-related comorbidities.   The risks and benefits of operative versus nonoperative potential alternatives and potential complications to include, but not limited to undesired cosmetic result; conversion to open procedure; wound hematoma, seroma, or infection; incisional hernia; chronic abdominal wall pain; injury to any intraabdominal or retroperitoneal structure; biliary or ductal injury; retained biliary stones; postoperative pancreatitis; DVT; PE; pneumonia; myocardial infarction; stroke; and potentially death were all discussed in detail with the patient prior to surgical procedure, who voiced his understanding and wished to proceed. DESCRIPTION OF PROCEDURE:  The patient received Ancef for antibiotic prophylaxis and Lovenox for DVT prophylaxis in the preoperative staging area. After voiding and then signing his operative permit, which was properly witnessed, he was then transported to the operating room, where he was placed in the supine position on the operating table and SCDs were placed and inflated prior to the induction of general endotracheal anesthesia. The abdomen was then prepped and draped in usual sterile fashion. A time-out procedure was performed according to protocol and agreed upon by all personnel in the operating suite. A transverse 12-mm cutaneous incision was made just superior to the umbilicus in the midline through which a 12-mm OptiView trocar and cannula were placed in the peritoneal cavity under direct vision utilizing a 10-mm 0-degree laparoscope. The laparoscope and trocar were removed. The abdomen was then insufflated with carbon dioxide gas never exceeding a pressure of 15 mmHg and a 30-degree 10-mm laparoscope was placed and utilized for the remainder of the procedure. Remaining ports were then placed under direct vision through transverse cutaneous incisions utilizing the OptiView trocars and cannulas. A 12-mm incision was made one-third the distance from the xiphisternal junction to the umbilicus. Two 5-mm incisions were made two fingerbreadths below the right costal margin, the first in the right anterior axillary line and second in the right midclavicular lines. Appropriately sized OptiView trocars and cannulas were placed in the peritoneal cavity under direct vision.   Visualization of the peritoneal cavity noted the patient to have multiple omental adhesions to the inferior surface of the gallbladder and right lobe of the liver. These were taken down utilizing electrocautery. The gallbladder fundus was retracted superiorly. The infundibulum was grasped and retracted in the inferolateral direction. A peritoneal incision was made at the junction of the cystic duct with the gallbladder and peritoneal incision was carried both medially and laterally to the level of the fundus at the junction of the gallbladder with the liver. Further dissection of triangle of Calot allowed delineation of the critical view and the cystic duct was clipped proximally. The cystic artery was clipped proximally and distally. Cystic ductotomy was performed. It was noted that the cystic duct was very small in size with a very small intraluminal diameter. This was dilated utilizing a [place]. A 14-[place] angiocatheter was introduced percutaneously through which a 4-[place] ureteral catheter was introduced into the peritoneal cavity. Multiple attempts were pursued to try to cannulate the very small cystic duct and ultimately we were unsuccessful and it was aborted. The cystic duct was then doubly clipped proximally and transected. The previously clipped cystic artery was transected. The gallbladder was then dissected from the hepatic fossa utilizing the electrocautery, placed in an Endopouch, and removed through the epigastric fascial defect. The epigastric fascial trocar defect was then closed using a suture passing device and #2 Vicryl suture. The laparoscope was shifted to the supraumbilical port site to allow visualization of the supraumbilical fascial trocar defect, which was closed with suture passing device and #2 Vicryl suture. All operative sites were inspected and noted to be hemostatic. The abdomen was then deflated with carbon dioxide gas. Trocars were removed under direct vision. The wounds were irrigated with normal saline solution. The fascial sutures were tied.   The cutaneous closure was then accomplished with a series of interrupted buried deep dermal 4-0 Monocryl sutures. Dermabond was applied. The sponge and needle counts were noted to be correct both during and at the completion of the case. The patient tolerated the procedure without difficulties and subsequently was extubated and transported to  the recovery room in awake, alert, and stable condition. Estimated blood loss was less than 10 mL. The patient received 1 liter of crystalloid during the procedure. Operative start time was 0810, completion time was 0855.         DO CHEKO [email or web address]  D:  01/05/2022 10:27  T:  01/05/2022 22:35  JOB #:  [id number]

## 2022-01-25 ENCOUNTER — PATIENT OUTREACH (OUTPATIENT)
Dept: OTHER | Age: 47
End: 2022-01-25

## 2022-01-25 NOTE — PROGRESS NOTES
HPRP follow up. Patient on with  [facility] OP visit for [address] with D/C 22 . Contacted patient for Health Promotion and Risk Prevention services. Verified  and Zipcode for HIPAA security. *Spoke with patient who reports that he is doing well. Denies pain - rates pain as 0 on 0-10 pain scale. Port Incisions are healing well. Patient states that he is progressing to Baseline Activity. Chart review: Unable to review Post Op Surgical notes - not in [facility] Care. Inpatient Readmission Risk score: No data recorded   Admission or ED Risk score: 1%    Changes, Goals addressed since last outreach:   GOAL: Understands red flags post discharge. - On Track  GOAL: Attends follow up appointments on schedule- On Track - Next Post Op Appointment Friday, 22 @ 1:45p    Any recurrence Red Flags or continued symptoms:  No     Medication Regimen Changes since last outreach: None    Any Identified Barriers to Care or Adherence with treatment plan: None     Patient states that he is doing well enough that additional support is not needed. Patient had no other questions or concerns. Contact information provided and reminded him that I can be reached if any needs arise in the future    Resolving current episode 22(Transitions of care complete). No further ED/UC or hospital admissions within 20 days post discharge. Patient attended follow-up appointments as directed. Patient has met patient stated and/or medical goals. No outreach from patient to [address] [place] [address].

## 2022-01-28 ENCOUNTER — OFFICE VISIT (OUTPATIENT)
Dept: SURGERY | Age: 47
End: 2022-01-28
Payer: COMMERCIAL

## 2022-01-28 VITALS
RESPIRATION RATE: 20 BRPM | HEIGHT: 75 IN | HEART RATE: 68 BPM | BODY MASS INDEX: 34.69 KG/M2 | TEMPERATURE: 98.4 F | WEIGHT: 279 LBS | SYSTOLIC BLOOD PRESSURE: 126 MMHG | DIASTOLIC BLOOD PRESSURE: 72 MMHG | OXYGEN SATURATION: 97 %

## 2022-01-28 DIAGNOSIS — Z90.49 STATUS POST LAPAROSCOPIC CHOLECYSTECTOMY: Primary | ICD-10-CM

## 2022-01-28 PROCEDURE — 99024 POSTOP FOLLOW-UP VISIT: CPT | Performed by: SURGERY

## 2022-01-28 NOTE — PROGRESS NOTES
Surgical Follow-Up Evaluation    Dano Dias is a 48 y.o. white male status post laparoscopic cholecystectomy with intraoperative cholangiography January 6, 2022 who presents in follow-up noting minimal expected incisional discomfort no longer requiring analgesics and otherwise without complaint. Patient is tolerant of a regular diet with normal bowel bladder habits since return home activities and lifting. Patient notes his preoperative symptomatology has completely resolved. The patient denies local or systemic signs and symptoms of infectious complications        No Known Allergies    Current Outpatient Medications on File Prior to Visit   Medication Sig Dispense Refill    lisinopril-hydroCHLOROthiazide (PRINZIDE, ZESTORETIC) 10-12.5 mg per tablet       therapeutic multivitamin-minerals (THERAGRAN-M) tablet Take 1 Tablet by mouth daily. No current facility-administered medications on file prior to visit. Past Medical History:   Diagnosis Date    Diverticulosis     Hypertension     Sleep apnea     cpap       Past Surgical History:   Procedure Laterality Date    HX CHOLECYSTECTOMY      HX COLONOSCOPY      HX GI      neuroendocrine tumor from rectal area       Social History     Tobacco Use    Smoking status: Never Smoker    Smokeless tobacco: Never Used   Vaping Use    Vaping Use: Never used   Substance Use Topics    Alcohol use: Not Currently    Drug use: Never       No family history on file. ROS:  General: Negative for fevers, chills, night sweats, fatigue, weight loss, or weight gain. GI: Negative for abdominal pain, change in bowel habits, hematochezia, melena, or GERD. Integumentary: Negative for dermatitis or abnormal moles.     HEENT: Negative for visual changes, vertigo, epistaxis, dysphagia, or hoarseness    Cardiac: Negative for chest pain, palpitations, hypertension, edema, or varicosities    Resp: Negative for cough, shortness of breath, wheezing, hemoptysis, snoring, or reactive airway disease    : Negative for hematuria, dysuria, frequency, urgency, nocturia, or stress urinary incontinence    MSK:  Negative for weakness, joint pain, or arthritis    Endocrine: Negative for diabetes, thyroid disease, polyuria, polydipsia, polyphagia, poor wound, heat intolerance, or cold intolerance. Lymph/Heme: Negative for anemia, bruising, or history of blood transfusions. Neuro:  Negative for dizziness, headache, fainting, seizures, and stroke. Psychiatry:  Negative for anxiety or depression    Physical Exam    Visit Vitals  /72   Pulse 68   Temp 98.4 °F (36.9 °C)   Resp 20   Ht 6' 3\" (1.905 m)   Wt 126.6 kg (279 lb)   SpO2 97%   BMI 34.87 kg/m²       Nursing note reviewed. General: 48 y.o. male is in no acute distress. Resp: Clear to auscultation bilaterally, no wheezing, rhonchi, or rales, excursions normal and symmetrical.  Cardio: Regular rate and rhythm, no murmurs, clicks, gallops, or rubs. No edema or varicosities. Abdomen: Obese, soft, nontender, nondistended, normoactive bowel sounds, no hernias, no hepatosplenomegaly, wounds clean dry approximated appropriate surrounding induration incisional tenderness without evidence infectious complications or incisional hernia    January 6, 2022 pathology/gallbladder: Chronic calculus cholecystitis      Impression    Status post laparoscopic cholecystectomy with intraoperative cholangiography with complete resolution of his preoperative symptomsdoing well      Plan    Continue pursuit of a healthy regular diet, utilization of analgesics and as-needed basis, refrain from heavy lifting until 1 month after surgical procedure was done in progress his activity as tolerated.   Follow-up as needed

## 2022-01-28 NOTE — PROGRESS NOTES
Chief Complaint   Patient presents with    Post OP Follow Up     Laparoscopic cholecystectomy with attempted intraoperative cholangiography. 1. Have you been to the ER, urgent care clinic since your last visit? Hospitalized since your last visit? No    2. Have you seen or consulted any other health care providers outside of the [address] [place] [address] since your last visit? Include any pap smears or colon screening.  No

## 2022-03-20 PROBLEM — K80.10 CHRONIC CHOLECYSTITIS WITH CALCULUS: Status: ACTIVE | Noted: 2022-01-05

## 2022-04-18 LAB
CHOLEST SERPL-MCNC: 170 MG/DL
GLUCOSE SERPL-MCNC: 96 MG/DL (ref 74–99)
HDLC SERPL-MCNC: 35 MG/DL (ref 40–60)
LDLC SERPL CALC-MCNC: 110 MG/DL (ref 0–100)
TRIGL SERPL-MCNC: 125 MG/DL (ref ?–150)

## 2023-05-20 RX ORDER — LISINOPRIL AND HYDROCHLOROTHIAZIDE 12.5; 1 MG/1; MG/1
TABLET ORAL
COMMUNITY
Start: 2021-12-10

## 2023-08-14 LAB
CHOLEST SERPL-MCNC: 163 MG/DL
CHOLEST SERPL-MCNC: 163 MG/DL
GLUCOSE SERPL-MCNC: 98 MG/DL (ref 74–99)
GLUCOSE SERPL-MCNC: 98 MG/DL (ref 74–99)
HDLC SERPL-MCNC: 44 MG/DL (ref 40–60)
HDLC SERPL-MCNC: 44 MG/DL (ref 40–60)
LDLC SERPL CALC-MCNC: 100.4 MG/DL (ref 0–100)
LDLC SERPL CALC-MCNC: 100.4 MG/DL (ref 0–100)
TRIGL SERPL-MCNC: 93 MG/DL
TRIGL SERPL-MCNC: 93 MG/DL (ref ?–150)

## 2024-08-12 LAB
CHOLEST SERPL-MCNC: 156 MG/DL
EST. AVERAGE GLUCOSE BLD GHB EST-MCNC: 108 MG/DL
GLUCOSE SERPL-MCNC: 93 MG/DL (ref 74–99)
HBA1C MFR BLD: 5.4 % (ref 4.2–5.6)
HDLC SERPL-MCNC: 38 MG/DL (ref 40–60)
LDLC SERPL CALC-MCNC: 100.6 MG/DL (ref 0–100)
TRIGL SERPL-MCNC: 87 MG/DL

## (undated) DEVICE — SCISSORS RMFG LAPAROSC 5MM -- LAWSON OEM ITEM 112765

## (undated) DEVICE — SOLUTION IRRIG 1000ML H2O STRL BLT

## (undated) DEVICE — TROCAR ENDOSCP BLDELSS 12X100 MM W/ HNDL STBL SL OPT TIP

## (undated) DEVICE — TROCAR LAP L100MM DIA5MM BLDELSS W/ STBL SL ENDOPATH XCEL

## (undated) DEVICE — SUTURE MCRYL SZ 4-0 L27IN ABSRB UD L24MM PS-1 3/8 CIR PRIM Y935H

## (undated) DEVICE — SUTURE VCRL SZ 2-0 L54IN ABSRB VLT W/O NDL POLYGLACTIN 910 J618H

## (undated) DEVICE — GARMENT,MEDLINE,DVT,INT,CALF,FOAM,MED: Brand: MEDLINE

## (undated) DEVICE — SOLUTION IV 1000ML 0.9% SOD CHL

## (undated) DEVICE — APPLIER RMFG CLIP R MED/LG --

## (undated) DEVICE — SLEEVE TRCR L100MM DIA5MM UNIV STBL FOR BLDELSS DIL TIP

## (undated) DEVICE — PACK PROCEDURE SURG LAPAROSCOPY 17X7 MM BRTRC PRIMUS

## (undated) DEVICE — REM POLYHESIVE ADULT PATIENT RETURN ELECTRODE: Brand: VALLEYLAB

## (undated) DEVICE — DRAPE XR C ARM 41X74IN LF --

## (undated) DEVICE — DERMABOND SKIN ADH 0.7ML -- DERMABOND ADVANCED 12/BX

## (undated) DEVICE — CATHETER IV 14GA L2IN POLYUR STR ORNG HUB SFTY RADPQ DISP

## (undated) DEVICE — GAUZE SPONGES,8 PLY: Brand: CURITY

## (undated) DEVICE — TROCAR ENDOSCP L100MM DIA12MM STBL SL BLDELSS ENDOPATH XCEL

## (undated) DEVICE — SYR 10ML LUER LOK 1/5ML GRAD --

## (undated) DEVICE — SET SUCT IRR TIP DISP STRYKEFLOW2

## (undated) DEVICE — KIT CLN UP BON SECOURS MARYV

## (undated) DEVICE — TISSUE RETRIEVAL SYSTEM: Brand: INZII RETRIEVAL SYSTEM

## (undated) DEVICE — CATHETER URET 4FR L70CM SGL LUMN W/ HUB OPN END FLEXIMA

## (undated) DEVICE — BLANKET WRM W40.2XL55.9IN IORT LO BODY + MISTRAL AIR